# Patient Record
Sex: MALE | Race: WHITE | HISPANIC OR LATINO | Employment: FULL TIME | URBAN - METROPOLITAN AREA
[De-identification: names, ages, dates, MRNs, and addresses within clinical notes are randomized per-mention and may not be internally consistent; named-entity substitution may affect disease eponyms.]

---

## 2024-03-02 NOTE — PROGRESS NOTES
"  Patient ID: [id number]     Chief Complaint: Establish Care        HPI:     Souleymane Reyes is a 43 y.o. male here today   Patient here to establish with PCP/Wellness visit- self referred to clinic.  Patient is interested in obtaining baseline labs.   1) Has had problems with right shoulder pain and right elbow pain for the last 4 years-initially diagnosed with tendinitis-which improved with physical therapy.  Pain restarted to have pain in the last 6 months-increased activity at work-feels that this is most likely related to activity changes.  Was evaluated at an urgent care in the past given meloxicam-did not like the effects of the medication.  Feels that physical therapy would be beneficial    History reviewed. No pertinent past medical history.     Past Surgical History:   Procedure Laterality Date    NO PAST SURGERIES          Social History     Tobacco Use    Smoking status: Never    Smokeless tobacco: Never   Substance Use Topics    Alcohol use: Yes     Alcohol/week: 2.0 standard drinks of alcohol     Types: 2 Cans of beer per week    Drug use: Never        Social History     Socioeconomic History    Marital status:      Spouse name: Lainey    Number of children: 3        No current outpatient medications    Review of patient's allergies indicates:  No Known Allergies     Patient Care Team:  Soledad Rinaldi MD as PCP - General (Family Medicine)     Subjective:     Review of Systems    12 point review of systems conducted, negative except as stated in the history of present illness. See HPI for details.      Objective:     Visit Vitals  /88 (BP Location: Left arm, Patient Position: Sitting)   Pulse (!) 59   Temp 98.7 °F (37.1 °C)   Ht 5' 8" (1.727 m)   Wt 90.3 kg (199 lb)   SpO2 98%   BMI 30.26 kg/m²       Physical Exam    General: Alert and oriented, No acute distress.  Head: Normocephalic, Atraumatic.  Eye: Pupils are equal, round and reactive to light, Extraocular movements are intact, " "Sclera non-icteric.  Ears/Nose/Throat: Normal, Mucosa moist,Clear.  Neck/Thyroid: Supple, Non-tender, No carotid bruit, No palpable thyromegaly or thyroid nodule, No lymphadenopathy, No JVD, Full range of motion.  Respiratory: Clear to auscultation bilaterally; No wheezes, rales or rhonchi,Non-labored respirations, Symmetrical chest wall expansion.  Cardiovascular: Regular rate and rhythm, S1/S2 normal, No murmurs, rubs or gallops.  Gastrointestinal: Soft, Non-tender, Non-distended, Normal bowel sounds, No palpable organomegaly.  Musculoskeletal: Normal range of motion, muscle strength intact, sensation intact area of tenderness below the shoulder-pain also at the elbow level.  Integumentary: Warm, Dry, Intact, No suspicious lesions or rashes.  Extremities: No clubbing, cyanosis or edema  Neurologic: No focal deficits, Cranial Nerves II-XII are grossly intact, Motor strength normal upper and lower extremities, Sensory exam intact.  Psychiatric: Normal interaction, Coherent speech, Euthymic mood, Appropriate affect       Assessment:       ICD-10-CM ICD-9-CM   1. Wellness examination  Z00.00 V70.0   2. Chronic right shoulder pain  M25.511 719.41    G89.29 338.29        Plan:     Testicular Cancer Screening - Reviewed.     Colon Cancer Screening -  Colon cancer screening to start at age 45      Prostate Cancer Screening-  Guidelines discussed-risks versus benefits discussed.     Eye Exam - Recommend annually.     Dental Exam - Recommend biannual exams.     Vaccinations - Immunization guidelines reviewed with the patient.  Encourage patient to prevent disease through immunizations.      There is no immunization history on file for this patient.     1. Wellness examination  Wellness: Five Rules Reviewed: Water/Nutrition-Real Food, Limit Fast Food/ Exercise 20-30 minutes most days of the week/ Mental health- "Is your work a calling or paycheck" Define 'What is your purpose-what matters to you' Stress- Is an Individual " Response- Therefore Can be Controlled by the Individual. Meditation/ Immunizations/Multivitamin use-Vitamin D3/ Screenings   - CBC Auto Differential; Future  - Comprehensive Metabolic Panel; Future  - Lipid Panel; Future  - TSH; Future  - Hemoglobin A1C; Future  - Urinalysis; Future  - Hepatitis C Antibody; Future  - HIV 1/2 Ag/Ab (4th Gen); Future    2. Chronic right shoulder pain  Pathophysiology/Treatment/ Dangers Discussed.  Per patient's request referral to physical therapy.  - Ambulatory referral/consult to Physical/Occupational Therapy; Future      The patient's Health Maintenance was reviewed and the following appears to be due at this time:   Health Maintenance Due   Topic Date Due    Hepatitis C Screening  Never done    Lipid Panel  Never done    COVID-19 Vaccine (1) Never done    HIV Screening  Never done    TETANUS VACCINE  Never done    Hemoglobin A1c (Diabetic Prevention Screening)  Never done    Influenza Vaccine (1) Never done         Follow up in about 2 weeks (around 3/21/2024). In addition to their scheduled follow up, the patient has also been instructed to follow up on as needed basis.     Future Appointments   Date Time Provider Department Center   3/28/2024  9:45 AM Soledad Rinaldi MD [facility] Rinaldi MD

## 2024-03-07 ENCOUNTER — OFFICE VISIT (OUTPATIENT)
Dept: FAMILY MEDICINE | Facility: CLINIC | Age: 44
End: 2024-03-07
Payer: COMMERCIAL

## 2024-03-07 VITALS
HEIGHT: 68 IN | DIASTOLIC BLOOD PRESSURE: 88 MMHG | OXYGEN SATURATION: 98 % | BODY MASS INDEX: 30.16 KG/M2 | SYSTOLIC BLOOD PRESSURE: 130 MMHG | TEMPERATURE: 99 F | WEIGHT: 199 LBS | HEART RATE: 59 BPM

## 2024-03-07 DIAGNOSIS — G89.29 CHRONIC RIGHT SHOULDER PAIN: ICD-10-CM

## 2024-03-07 DIAGNOSIS — M25.511 CHRONIC RIGHT SHOULDER PAIN: ICD-10-CM

## 2024-03-07 DIAGNOSIS — Z00.00 WELLNESS EXAMINATION: Primary | ICD-10-CM

## 2024-03-07 PROCEDURE — 99386 PREV VISIT NEW AGE 40-64: CPT | Mod: ,,, | Performed by: FAMILY MEDICINE

## 2024-03-07 PROCEDURE — 3079F DIAST BP 80-89 MM HG: CPT | Mod: CPTII,,, | Performed by: FAMILY MEDICINE

## 2024-03-07 PROCEDURE — 1160F RVW MEDS BY RX/DR IN RCRD: CPT | Mod: CPTII,,, | Performed by: FAMILY MEDICINE

## 2024-03-07 PROCEDURE — 1159F MED LIST DOCD IN RCRD: CPT | Mod: CPTII,,, | Performed by: FAMILY MEDICINE

## 2024-03-07 PROCEDURE — 3075F SYST BP GE 130 - 139MM HG: CPT | Mod: CPTII,,, | Performed by: FAMILY MEDICINE

## 2024-03-07 PROCEDURE — 3008F BODY MASS INDEX DOCD: CPT | Mod: CPTII,,, | Performed by: FAMILY MEDICINE

## 2024-03-11 ENCOUNTER — LAB VISIT (OUTPATIENT)
Dept: LAB | Facility: HOSPITAL | Age: 44
End: 2024-03-11
Attending: FAMILY MEDICINE
Payer: COMMERCIAL

## 2024-03-11 DIAGNOSIS — Z00.00 WELLNESS EXAMINATION: ICD-10-CM

## 2024-03-11 LAB
ALBUMIN SERPL-MCNC: 4.2 G/DL (ref 3.5–5)
ALBUMIN/GLOB SERPL: 1.4 RATIO (ref 1.1–2)
ALP SERPL-CCNC: 90 UNIT/L (ref 40–150)
ALT SERPL-CCNC: 21 UNIT/L (ref 0–55)
APPEARANCE UR: CLEAR
AST SERPL-CCNC: 17 UNIT/L (ref 5–34)
BACTERIA #/AREA URNS AUTO: ABNORMAL /HPF
BASOPHILS # BLD AUTO: 0.03 X10(3)/MCL
BASOPHILS NFR BLD AUTO: 0.4 %
BILIRUB SERPL-MCNC: 0.6 MG/DL
BILIRUB UR QL STRIP.AUTO: NEGATIVE
BUN SERPL-MCNC: 15.9 MG/DL (ref 8.9–20.6)
CALCIUM SERPL-MCNC: 8.9 MG/DL (ref 8.4–10.2)
CHLORIDE SERPL-SCNC: 107 MMOL/L (ref 98–107)
CHOLEST SERPL-MCNC: 222 MG/DL
CHOLEST/HDLC SERPL: 4 {RATIO} (ref 0–5)
CO2 SERPL-SCNC: 28 MMOL/L (ref 22–29)
COLOR UR AUTO: COLORLESS
CREAT SERPL-MCNC: 0.78 MG/DL (ref 0.73–1.18)
EOSINOPHIL # BLD AUTO: 0.15 X10(3)/MCL (ref 0–0.9)
EOSINOPHIL NFR BLD AUTO: 2.1 %
ERYTHROCYTE [DISTWIDTH] IN BLOOD BY AUTOMATED COUNT: 12.9 % (ref 11.5–17)
EST. AVERAGE GLUCOSE BLD GHB EST-MCNC: 111.2 MG/DL
GFR SERPLBLD CREATININE-BSD FMLA CKD-EPI: >60 MLS/MIN/1.73/M2
GLOBULIN SER-MCNC: 2.9 GM/DL (ref 2.4–3.5)
GLUCOSE SERPL-MCNC: 96 MG/DL (ref 74–100)
GLUCOSE UR QL STRIP.AUTO: NORMAL
HBA1C MFR BLD: 5.5 %
HCT VFR BLD AUTO: 46.2 % (ref 42–52)
HDLC SERPL-MCNC: 54 MG/DL (ref 35–60)
HGB BLD-MCNC: 15.3 G/DL (ref 14–18)
IMM GRANULOCYTES # BLD AUTO: 0.01 X10(3)/MCL (ref 0–0.04)
IMM GRANULOCYTES NFR BLD AUTO: 0.1 %
KETONES UR QL STRIP.AUTO: NEGATIVE
LDLC SERPL CALC-MCNC: 136 MG/DL (ref 50–140)
LEUKOCYTE ESTERASE UR QL STRIP.AUTO: NEGATIVE
LYMPHOCYTES # BLD AUTO: 3.03 X10(3)/MCL (ref 0.6–4.6)
LYMPHOCYTES NFR BLD AUTO: 42.5 %
MCH RBC QN AUTO: 30.4 PG (ref 27–31)
MCHC RBC AUTO-ENTMCNC: 33.1 G/DL (ref 33–36)
MCV RBC AUTO: 91.8 FL (ref 80–94)
MONOCYTES # BLD AUTO: 0.53 X10(3)/MCL (ref 0.1–1.3)
MONOCYTES NFR BLD AUTO: 7.4 %
NEUTROPHILS # BLD AUTO: 3.38 X10(3)/MCL (ref 2.1–9.2)
NEUTROPHILS NFR BLD AUTO: 47.5 %
NITRITE UR QL STRIP.AUTO: NEGATIVE
NRBC BLD AUTO-RTO: 0 %
PH UR STRIP.AUTO: 6 [PH]
PLATELET # BLD AUTO: 153 X10(3)/MCL (ref 130–400)
PMV BLD AUTO: 10.9 FL (ref 7.4–10.4)
POTASSIUM SERPL-SCNC: 3.9 MMOL/L (ref 3.5–5.1)
PROT SERPL-MCNC: 7.1 GM/DL (ref 6.4–8.3)
PROT UR QL STRIP.AUTO: NEGATIVE
RBC # BLD AUTO: 5.03 X10(6)/MCL (ref 4.7–6.1)
RBC #/AREA URNS AUTO: ABNORMAL /HPF
RBC UR QL AUTO: NEGATIVE
SODIUM SERPL-SCNC: 141 MMOL/L (ref 136–145)
SP GR UR STRIP.AUTO: 1 (ref 1–1.03)
SQUAMOUS #/AREA URNS LPF: ABNORMAL /HPF
TRIGL SERPL-MCNC: 158 MG/DL (ref 34–140)
TSH SERPL-ACNC: 3.41 UIU/ML (ref 0.35–4.94)
UROBILINOGEN UR STRIP-ACNC: NORMAL
VLDLC SERPL CALC-MCNC: 32 MG/DL
WBC # SPEC AUTO: 7.13 X10(3)/MCL (ref 4.5–11.5)
WBC #/AREA URNS AUTO: ABNORMAL /HPF

## 2024-03-11 PROCEDURE — 80053 COMPREHEN METABOLIC PANEL: CPT

## 2024-03-11 PROCEDURE — 84443 ASSAY THYROID STIM HORMONE: CPT

## 2024-03-11 PROCEDURE — 83036 HEMOGLOBIN GLYCOSYLATED A1C: CPT

## 2024-03-11 PROCEDURE — 87389 HIV-1 AG W/HIV-1&-2 AB AG IA: CPT

## 2024-03-11 PROCEDURE — 81001 URINALYSIS AUTO W/SCOPE: CPT

## 2024-03-11 PROCEDURE — 80061 LIPID PANEL: CPT

## 2024-03-11 PROCEDURE — 85025 COMPLETE CBC W/AUTO DIFF WBC: CPT

## 2024-03-11 PROCEDURE — 86803 HEPATITIS C AB TEST: CPT

## 2024-03-11 PROCEDURE — 36415 COLL VENOUS BLD VENIPUNCTURE: CPT

## 2024-03-12 LAB
HCV AB SERPL QL IA: NONREACTIVE
HIV 1+2 AB+HIV1 P24 AG SERPL QL IA: NONREACTIVE

## 2024-03-24 NOTE — PROGRESS NOTES
"   Patient ID: [id number]     Chief Complaint: Discuss Lab Results      HPI:     Souleymane Reyes is a 43 y.o. male here today for follow up- to discuss test results.  Patient has been doing well since last visit.      Past Medical History:   Diagnosis Date    Mixed hyperlipidemia 03/29/2024        Past Surgical History:   Procedure Laterality Date    NO PAST SURGERIES          Social History     Tobacco Use    Smoking status: Never    Smokeless tobacco: Never   Substance Use Topics    Alcohol use: Yes     Alcohol/week: 2.0 standard drinks of alcohol     Types: 2 Cans of beer per week    Drug use: Never        Social History     Socioeconomic History    Marital status:      Spouse name: Lainey    Number of children: 3        No current outpatient medications    Review of patient's allergies indicates:  No Known Allergies     Patient Care Team:  Soledad Rinaldi MD as PCP - General (Family Medicine)       Subjective:     Review of Systems    12 point review of systems conducted, negative except as stated in the history of present illness. See HPI for details.      Objective:     Visit Vitals  /86   Pulse 76   Temp 98.6 °F (37 °C)   Ht 5' 8" (1.727 m)   Wt 89.4 kg (197 lb)   SpO2 98%   BMI 29.95 kg/m²       Physical Exam    General: Alert and oriented, No acute distress.  Head: Normocephalic, Atraumatic.  Eye: Pupils are equal, round and reactive to light, Extraocular movements are intact, Sclera non-icteric.  Ears/Nose/Throat: Normal, Mucosa moist,Clear.  Neck/Thyroid: Supple, Non-tender  Respiratory: Clear to auscultation bilaterally  Cardiovascular: Regular rate and rhythm, S1/S2 normal  Gastrointestinal: Soft, Non-tender, Non-distended, Normal bowel sounds, No palpable organomegaly.  Musculoskeletal: Normal range of motion.  Integumentary: Warm, Dry  Extremities: No clubbing, cyanosis or edema  Neurologic: No focal deficits, Cranial Nerves II-XII are grossly intact  Psychiatric: Normal " interaction, Coherent speech       Assessment:       ICD-10-CM ICD-9-CM   1. Encounter to discuss test results  Z71.2 V65.49   2. Mixed hyperlipidemia  E78.2 272.2   3. Wellness examination  Z00.00 V70.0        Plan:     1. Encounter to discuss test results  Abnormal Lab Results: CMP/CBC/HGBA1C/TSH/FLP/UA/ Hepatitis-C/HIV. Discussed.    2. Mixed hyperlipidemia  Lab Results   Component Value Date    CHOL 222 (H) 03/11/2024    .00 03/11/2024    TRIG 158 (H) 03/11/2024    HDL 54 03/11/2024     Pathophysiology/treatment/dangers discussed. Patient to continue diet modification.   Total cholesterol 222 ( Goal less than 200) HDL 54 ( Goal high as possible)  ( Goal less than 100) Triglycerides 158 ( Goal less than 150)    The 10-year ASCVD risk score (Roberta GUDINO, et al., 2019) is: 1.9%    Values used to calculate the score:      Age: 43 years      Sex: Male      Is Non- : No      Diabetic: No      Tobacco smoker: No      Systolic Blood Pressure: 126 mmHg      Is BP treated: No      HDL Cholesterol: 54 mg/dL      Total Cholesterol: 222 mg/dL    3. Wellness examination  Patient given lab orders to be completed before wellness visit.   - CBC Auto Differential; Future  - Comprehensive Metabolic Panel; Future  - Lipid Panel; Future  - TSH; Future  - Hemoglobin A1C; Future  - Urinalysis; Future  - Ambulatory referral/consult to Gastroenterology; Future    Follow up in about 1 year (around 3/28/2025) for Annual Wellness. In addition to their scheduled follow up, the patient has also been instructed to follow up on as needed basis.     Future Appointments   Date Time Provider Department Center   4/11/2025  9:00 AM Soledad Rinaldi MD [facility] [place] Indira Gautam, MD

## 2024-03-28 ENCOUNTER — OFFICE VISIT (OUTPATIENT)
Dept: FAMILY MEDICINE | Facility: CLINIC | Age: 44
End: 2024-03-28
Payer: COMMERCIAL

## 2024-03-28 VITALS
HEART RATE: 76 BPM | SYSTOLIC BLOOD PRESSURE: 126 MMHG | BODY MASS INDEX: 29.86 KG/M2 | TEMPERATURE: 99 F | WEIGHT: 197 LBS | OXYGEN SATURATION: 98 % | HEIGHT: 68 IN | DIASTOLIC BLOOD PRESSURE: 86 MMHG

## 2024-03-28 DIAGNOSIS — Z00.00 WELLNESS EXAMINATION: ICD-10-CM

## 2024-03-28 DIAGNOSIS — Z71.2 ENCOUNTER TO DISCUSS TEST RESULTS: Primary | ICD-10-CM

## 2024-03-28 DIAGNOSIS — E78.2 MIXED HYPERLIPIDEMIA: ICD-10-CM

## 2024-03-28 PROCEDURE — 99213 OFFICE O/P EST LOW 20 MIN: CPT | Mod: ,,, | Performed by: FAMILY MEDICINE

## 2024-03-28 PROCEDURE — 3008F BODY MASS INDEX DOCD: CPT | Mod: CPTII,,, | Performed by: FAMILY MEDICINE

## 2024-03-28 PROCEDURE — 1159F MED LIST DOCD IN RCRD: CPT | Mod: CPTII,,, | Performed by: FAMILY MEDICINE

## 2024-03-28 PROCEDURE — 3079F DIAST BP 80-89 MM HG: CPT | Mod: CPTII,,, | Performed by: FAMILY MEDICINE

## 2024-03-28 PROCEDURE — 1160F RVW MEDS BY RX/DR IN RCRD: CPT | Mod: CPTII,,, | Performed by: FAMILY MEDICINE

## 2024-03-28 PROCEDURE — 3074F SYST BP LT 130 MM HG: CPT | Mod: CPTII,,, | Performed by: FAMILY MEDICINE

## 2024-03-28 PROCEDURE — 3044F HG A1C LEVEL LT 7.0%: CPT | Mod: CPTII,,, | Performed by: FAMILY MEDICINE

## 2024-03-29 PROBLEM — E78.2 MIXED HYPERLIPIDEMIA: Status: ACTIVE | Noted: 2024-03-29

## 2024-05-13 ENCOUNTER — TELEPHONE (OUTPATIENT)
Dept: FAMILY MEDICINE | Facility: CLINIC | Age: 44
End: 2024-05-13
Payer: COMMERCIAL

## 2024-05-13 NOTE — TELEPHONE ENCOUNTER
Spoke with [facility]/ Amaya Lundy, insurance will not pay for colonoscopy screening until pt turns 44 .. She stated to resend referral a day or two after his birthday

## 2025-03-14 ENCOUNTER — OFFICE VISIT (OUTPATIENT)
Dept: URGENT CARE | Facility: CLINIC | Age: 45
End: 2025-03-14
Payer: COMMERCIAL

## 2025-03-14 VITALS
OXYGEN SATURATION: 99 % | TEMPERATURE: 98 F | BODY MASS INDEX: 30.31 KG/M2 | WEIGHT: 200 LBS | DIASTOLIC BLOOD PRESSURE: 101 MMHG | SYSTOLIC BLOOD PRESSURE: 152 MMHG | HEART RATE: 72 BPM | RESPIRATION RATE: 20 BRPM | HEIGHT: 68 IN

## 2025-03-14 DIAGNOSIS — Z71.84 ENCOUNTER FOR HEALTH COUNSELING RELATED TO TRAVEL: Primary | ICD-10-CM

## 2025-03-14 RX ORDER — ATOVAQUONE AND PROGUANIL HYDROCHLORIDE 250; 100 MG/1; MG/1
1 TABLET, FILM COATED ORAL DAILY
Qty: 40 TABLET | Refills: 0 | Status: SHIPPED | OUTPATIENT
Start: 2025-03-14 | End: 2025-04-23

## 2025-03-14 NOTE — PROGRESS NOTES
"Subjective:      Patient ID: Souleymane Reyes is a 44 y.o. male.    Vitals:  height is 5' 8" (1.727 m) and weight is 90.7 kg (200 lb). His tympanic temperature is 98.2 °F (36.8 °C). His blood pressure is 152/101 (abnormal) and his pulse is 72. His respiration is 20 and oxygen saturation is 99%.     Chief Complaint: Medication Refill     Patient is a 44 y.o. male who presents to urgent care with complaints of traveling to the country of [place] and is requesting a prescription of Malarone for malaria prevention.  He states that he will be in country for a maximum of 30 days.  He states that he is up-to-date on vaccinations required/recommended for this country.     ROS   Objective:     Physical Exam   HENT:   Head: Normocephalic and atraumatic.   Nose: Nose normal.   Cardiovascular: Normal rate, regular rhythm and normal heart sounds.   Pulmonary/Chest: Effort normal and breath sounds normal.   Abdominal: Normal appearance.   Neurological: He is alert.   Psychiatric: His behavior is normal. Mood normal.     Per CDC guidelines-  Patient was instructed to start the medication 2 days prior to entry into the country.  He is to continue this medication for 7 days after leaving the country.         Previous History      Review of patient's allergies indicates:  No Known Allergies    Past Medical History:   Diagnosis Date    Mixed hyperlipidemia 03/29/2024     Current Outpatient Medications   Medication Instructions    atovaquone-proguaniL (MALARONE) 250-100 mg Tab 1 tablet, Oral, Daily     Past Surgical History:   Procedure Laterality Date    NO PAST SURGERIES       Family History   Problem Relation Name Age of Onset    No Known Problems Mother          : [place]    Diabetes Father      Hypertension Father         Social History[1]     Physical Exam      Vital Signs Reviewed   BP (!) 152/101   Pulse 72   Temp 98.2 °F (36.8 °C) (Tympanic)   Resp 20   Ht 5' 8" (1.727 m)   Wt 90.7 kg (200 lb)   SpO2 99%   " BMI 30.41 kg/m²        Procedures    Procedures     Labs     Results for orders placed or performed in visit on 03/11/24   Comprehensive Metabolic Panel    Collection Time: 03/11/24  7:18 AM   Result Value Ref Range    Sodium 141 136 - 145 mmol/L    Potassium 3.9 3.5 - 5.1 mmol/L    Chloride 107 98 - 107 mmol/L    CO2 28 22 - 29 mmol/L    Glucose 96 74 - 100 mg/dL    Blood Urea Nitrogen 15.9 8.9 - 20.6 mg/dL    Creatinine 0.78 0.73 - 1.18 mg/dL    Calcium 8.9 8.4 - 10.2 mg/dL    Protein Total 7.1 6.4 - 8.3 gm/dL    Albumin 4.2 3.5 - 5.0 g/dL    Globulin 2.9 2.4 - 3.5 gm/dL    Albumin/Globulin Ratio 1.4 1.1 - 2.0 ratio    Bilirubin Total 0.6 <=1.5 mg/dL    ALP 90 40 - 150 unit/L    ALT 21 0 - 55 unit/L    AST 17 5 - 34 unit/L    eGFR >60 mls/min/1.73/m2   Lipid Panel    Collection Time: 03/11/24  7:18 AM   Result Value Ref Range    Cholesterol Total 222 (H) <=200 mg/dL    HDL Cholesterol 54 35 - 60 mg/dL    Triglyceride 158 (H) 34 - 140 mg/dL    Cholesterol/HDL Ratio 4 0 - 5    Very Low Density Lipoprotein 32     LDL Cholesterol 136.00 50.00 - 140.00 mg/dL   TSH    Collection Time: 03/11/24  7:18 AM   Result Value Ref Range    TSH 3.407 0.350 - 4.940 uIU/mL   Hemoglobin A1C    Collection Time: 03/11/24  7:18 AM   Result Value Ref Range    Hemoglobin A1c 5.5 <=7.0 %    Estimated Average Glucose 111.2 mg/dL   Urinalysis    Collection Time: 03/11/24  7:18 AM   Result Value Ref Range    Color, UA Colorless Yellow, Light-Yellow, Colorless, Straw, Dark-Yellow    Appearance, UA Clear Clear    Specific Gravity, UA 1.002 (L) 1.005 - 1.030    pH, UA 6.0 5.0 - 8.5    Protein, UA Negative Negative    Glucose, UA Normal Negative, Normal    Ketones, UA Negative Negative    Blood, UA Negative Negative    Bilirubin, UA Negative Negative    Urobilinogen, UA Normal 0.2, 1.0, Normal    Nitrites, UA Negative Negative    Leukocyte Esterase, UA Negative Negative    WBC, UA 0-5 None Seen, 0-2, 3-5, 0-5 /HPF    Bacteria, UA None Seen None  Seen, Trace /HPF    Squamous Epithelial Cells, UA None Seen None Seen /HPF    RBC, UA 0-5 None Seen, 0-2, 3-5, 0-5 /HPF   Hepatitis C Antibody    Collection Time: 03/11/24  7:18 AM   Result Value Ref Range    Hep C Ab Interp Nonreactive Nonreactive   HIV 1/2 Ag/Ab (4th Gen)    Collection Time: 03/11/24  7:18 AM   Result Value Ref Range    HIV Nonreactive Nonreactive   CBC with Differential    Collection Time: 03/11/24  7:18 AM   Result Value Ref Range    WBC 7.13 4.50 - 11.50 x10(3)/mcL    RBC 5.03 4.70 - 6.10 x10(6)/mcL    Hgb 15.3 14.0 - 18.0 g/dL    Hct 46.2 42.0 - 52.0 %    MCV 91.8 80.0 - 94.0 fL    MCH 30.4 27.0 - 31.0 pg    MCHC 33.1 33.0 - 36.0 g/dL    RDW 12.9 11.5 - 17.0 %    Platelet 153 130 - 400 x10(3)/mcL    MPV 10.9 (H) 7.4 - 10.4 fL    Neut % 47.5 %    Lymph % 42.5 %    Mono % 7.4 %    Eos % 2.1 %    Basophil % 0.4 %    Lymph # 3.03 0.6 - 4.6 x10(3)/mcL    Neut # 3.38 2.1 - 9.2 x10(3)/mcL    Mono # 0.53 0.1 - 1.3 x10(3)/mcL    Eos # 0.15 0 - 0.9 x10(3)/mcL    Baso # 0.03 <=0.2 x10(3)/mcL    Imm Gran # 0.01 0 - 0.04 x10(3)/mcL    Imm Grans % 0.1 %    NRBC% 0.0 %     Assessment:     1. Encounter for health counseling related to travel        Plan:       Encounter for health counseling related to travel    Other orders  -     atovaquone-proguaniL (MALARONE) 250-100 mg Tab; Take 1 tablet by mouth once daily.  Dispense: 40 tablet; Refill: 0      Follow up as needed.                    [1]   Social History  Tobacco Use    Smoking status: Never    Smokeless tobacco: Never   Substance Use Topics    Alcohol use: Yes     Alcohol/week: 2.0 standard drinks of alcohol     Types: 2 Cans of beer per week    Drug use: Never

## 2025-04-07 ENCOUNTER — TELEPHONE (OUTPATIENT)
Dept: FAMILY MEDICINE | Facility: CLINIC | Age: 45
End: 2025-04-07
Payer: COMMERCIAL

## 2025-04-07 NOTE — TELEPHONE ENCOUNTER
Left vm to confirm appt with fasting labs - orders placed      1. Are there any outstanding tasks in the patient's chart? Yes, fasting labs    2. Is there any documentation in the chart? No    3.Has patient been seen in an ER, Urgent care clinic, or been admitted since last visit?  If yes, When, where, and why    4. Has patient seen any other healthcare providers since last visit?  If yes, when, where, and why    5. Has patient had any bloodwork or XR done since last visit?    6. Is patient signed up for patient portal?

## 2025-05-16 NOTE — PROGRESS NOTES
"  Patient ID: [id number]     Chief Complaint: Annual Exam        HPI:     Souleymane Reyes is a 44 y.o. male here today for an annual wellness. Reviewed and discussed lab results.   Patient had concerns about risk for heart disease-recently evaluated by cardiologist-blood pressure was elevated-patient is started on valsartan- he  has been taking medicine daily. No symptoms associated with increased BP such as headache/ visual changes/ dizziness/ chest pain.     Past Medical History:   Diagnosis Date    Mixed hyperlipidemia 03/29/2024        Past Surgical History:   Procedure Laterality Date    NO PAST SURGERIES          Social History[1]     Social History[2]     Current Outpatient Medications   Medication Instructions    valsartan (DIOVAN) 160 mg, Daily       Review of patient's allergies indicates:  No Known Allergies     Patient Care Team:  Soledad Rinaldi MD as PCP - General (Family Medicine)  Ronnell Gamino MD as Consulting Physician (Cardiology)     Subjective:     Review of Systems    12 point review of systems conducted, negative except as stated in the history of present illness. See HPI for details.      Objective:     Visit Vitals  /88 (BP Location: Left arm, Patient Position: Sitting)   Pulse 78   Temp 98 °F (36.7 °C) (Oral)   Resp 18   Ht 5' 8" (1.727 m)   Wt 89 kg (196 lb 3.2 oz)   SpO2 99%   BMI 29.83 kg/m²       Physical Exam    General: Alert and oriented, No acute distress.  Head: Normocephalic, Atraumatic.  Eye: Pupils are equal, round and reactive to light, Extraocular movements are intact, Sclera non-icteric.  Ears/Nose/Throat: Normal, Mucosa moist,Clear.  Neck/Thyroid: Supple, Non-tender,  No palpable thyromegaly or thyroid nodule, No lymphadenopathy, No JVD, Full range of motion.  Respiratory: Clear to auscultation bilaterally; No wheezes, rales or rhonchi,Non-labored respirations, Symmetrical chest wall expansion.  Cardiovascular: Regular rate and rhythm, S1/S2 normal, No " "murmurs, rubs or gallops.  Gastrointestinal: Soft, Non-tender, Non-distended, Normal bowel sounds, No palpable organomegaly.  Musculoskeletal: Normal range of motion.  Integumentary: Warm, Dry, Intact, No suspicious lesions or rashes.  Extremities: No clubbing, cyanosis or edema  Neurologic: No focal deficits, Cranial Nerves II-XII are grossly intact, Motor strength normal upper and lower extremities, Sensory exam intact.  Psychiatric: Normal interaction, Coherent speech, Euthymic mood, Appropriate affect       Assessment:       ICD-10-CM ICD-9-CM   1. Wellness examination  Z00.00 V70.0   2. Mixed hyperlipidemia  E78.2 272.2   3. Essential hypertension  I10 401.9   4. Abnormal urine  R82.90 791.9        Plan:       Testicular Cancer Screening - Reviewed.     Colon Cancer Screening -  Colon cancer screening referral completed    Prostate Cancer Screening-  Guidelines discussed-risks versus benefits discussed.      Eye Exam - Recommend annually.     Dental Exam - Recommend biannual exams.     Vaccinations -   There is no immunization history on file for this patient. Immunization guidelines reviewed with the patient.  Encourage patient to prevent disease through immunizations.    1. Wellness examination (Primary)  Wellness: Five Rules Reviewed:  Healthy community-Relationships/ Water/Nutrition-Real Food, Limit Fast Food/ Exercise 20-30 minutes most days of the week/ Mental health- "Is your work a calling or paycheck" Define 'What is your purpose-what matters to you' Stress- Is an Individual Response- Therefore Can be Controlled by the Individual. Meditation/ Immunizations/Multivitamin use-Vitamin D3/ Screenings   - Ambulatory referral/consult to Gastroenterology; Future    2. Mixed hyperlipidemia  Lab Results   Component Value Date    CHOL 216 (H) 05/26/2025    .00 05/26/2025    TRIG 145 (H) 05/26/2025    HDL 55 05/26/2025     Pathophysiology/treatment/dangers discussed. Patient to continue diet modification.  " American College of Cardiology guidelines discussed-patient does have appointment for calcium scan this week.  Total cholesterol 216 ( Goal less than 200) HDL 55 ( Goal high as possible)  ( Goal less than 100) Triglycerides 145 ( Goal less than 150)   The 10-year ASCVD risk score (Roberta GUDINO, et al., 2019) is: 2.4%    Values used to calculate the score:      Age: 44 years      Sex: Male      Is Non- : No      Diabetic: No      Tobacco smoker: No      Systolic Blood Pressure: 129 mmHg      Is BP treated: Yes      HDL Cholesterol: 55 mg/dL      Total Cholesterol: 216 mg/dL    3. Essential hypertension  Patient has been taking valsartan. Blood pressure goal of less than 130/80-blood pressure is stable. Continue to encourage diet and activity modifications. Including stress management. Pathophysiology/treatment/dangers discussed.      4. Abnormal urine  Check studies management based upon results.  Pathophysiology/treatment/dangers discussed.    - Urinalysis, Reflex to Urine Culture Urine, Clean Catch; Future        The patient's Health Maintenance was reviewed and the following appears to be due at this time:   Health Maintenance Due   Topic Date Due    TETANUS VACCINE  Never done    Colorectal Cancer Screening  Never done         Follow up in about 1 year (around 5/26/2026) for Annual Wellness. In addition to their scheduled follow up, the patient has also been instructed to follow up on as needed basis.     No future appointments.       Soledad Rinaldi MD           [1]   Social History  Tobacco Use    Smoking status: Never    Smokeless tobacco: Never   Substance Use Topics    Alcohol use: Yes     Alcohol/week: 2.0 standard drinks of alcohol     Types: 2 Cans of beer per week    Drug use: Never   [2]   Social History  Socioeconomic History    Marital status:      Spouse name: Lainey    Number of children: 3

## 2025-05-26 ENCOUNTER — OFFICE VISIT (OUTPATIENT)
Dept: FAMILY MEDICINE | Facility: CLINIC | Age: 45
End: 2025-05-26
Payer: COMMERCIAL

## 2025-05-26 ENCOUNTER — LAB VISIT (OUTPATIENT)
Dept: LAB | Facility: HOSPITAL | Age: 45
End: 2025-05-26
Attending: FAMILY MEDICINE
Payer: COMMERCIAL

## 2025-05-26 VITALS
DIASTOLIC BLOOD PRESSURE: 88 MMHG | WEIGHT: 196.19 LBS | HEIGHT: 68 IN | SYSTOLIC BLOOD PRESSURE: 129 MMHG | TEMPERATURE: 98 F | OXYGEN SATURATION: 99 % | BODY MASS INDEX: 29.73 KG/M2 | RESPIRATION RATE: 18 BRPM | HEART RATE: 78 BPM

## 2025-05-26 DIAGNOSIS — R82.90 ABNORMAL URINE: ICD-10-CM

## 2025-05-26 DIAGNOSIS — Z00.00 WELLNESS EXAMINATION: Primary | ICD-10-CM

## 2025-05-26 DIAGNOSIS — Z00.00 WELLNESS EXAMINATION: ICD-10-CM

## 2025-05-26 DIAGNOSIS — I10 ESSENTIAL HYPERTENSION: ICD-10-CM

## 2025-05-26 DIAGNOSIS — E78.2 MIXED HYPERLIPIDEMIA: ICD-10-CM

## 2025-05-26 LAB
ALBUMIN SERPL-MCNC: 4.1 G/DL (ref 3.5–5)
ALBUMIN/GLOB SERPL: 1.2 RATIO (ref 1.1–2)
ALP SERPL-CCNC: 81 UNIT/L (ref 40–150)
ALT SERPL-CCNC: 16 UNIT/L (ref 0–55)
AMORPH URATE CRY URNS QL MICRO: ABNORMAL /UL
ANION GAP SERPL CALC-SCNC: 6 MEQ/L
AST SERPL-CCNC: 15 UNIT/L (ref 11–45)
BACTERIA #/AREA URNS AUTO: ABNORMAL /HPF
BASOPHILS # BLD AUTO: 0.03 X10(3)/MCL
BASOPHILS NFR BLD AUTO: 0.5 %
BILIRUB SERPL-MCNC: 0.8 MG/DL
BILIRUB UR QL STRIP.AUTO: NEGATIVE
BUN SERPL-MCNC: 17.2 MG/DL (ref 8.9–20.6)
CALCIUM SERPL-MCNC: 9 MG/DL (ref 8.4–10.2)
CHLORIDE SERPL-SCNC: 108 MMOL/L (ref 98–107)
CHOLEST SERPL-MCNC: 216 MG/DL
CHOLEST/HDLC SERPL: 4 {RATIO} (ref 0–5)
CLARITY UR: ABNORMAL
CO2 SERPL-SCNC: 26 MMOL/L (ref 22–29)
COLOR UR AUTO: ABNORMAL
CREAT SERPL-MCNC: 0.84 MG/DL (ref 0.72–1.25)
CREAT/UREA NIT SERPL: 20
EOSINOPHIL # BLD AUTO: 0.12 X10(3)/MCL (ref 0–0.9)
EOSINOPHIL NFR BLD AUTO: 2 %
ERYTHROCYTE [DISTWIDTH] IN BLOOD BY AUTOMATED COUNT: 13.1 % (ref 11.5–17)
EST. AVERAGE GLUCOSE BLD GHB EST-MCNC: 111.2 MG/DL
GFR SERPLBLD CREATININE-BSD FMLA CKD-EPI: >60 ML/MIN/1.73/M2
GLOBULIN SER-MCNC: 3.3 GM/DL (ref 2.4–3.5)
GLUCOSE SERPL-MCNC: 97 MG/DL (ref 74–100)
GLUCOSE UR QL STRIP: NORMAL
HBA1C MFR BLD: 5.5 %
HCT VFR BLD AUTO: 45.3 % (ref 42–52)
HDLC SERPL-MCNC: 55 MG/DL (ref 35–60)
HGB BLD-MCNC: 14.9 G/DL (ref 14–18)
HGB UR QL STRIP: ABNORMAL
IMM GRANULOCYTES # BLD AUTO: 0.02 X10(3)/MCL (ref 0–0.04)
IMM GRANULOCYTES NFR BLD AUTO: 0.3 %
KETONES UR QL STRIP: NEGATIVE
LDLC SERPL CALC-MCNC: 132 MG/DL (ref 50–140)
LEUKOCYTE ESTERASE UR QL STRIP: NEGATIVE
LYMPHOCYTES # BLD AUTO: 2.47 X10(3)/MCL (ref 0.6–4.6)
LYMPHOCYTES NFR BLD AUTO: 40.3 %
MCH RBC QN AUTO: 30.5 PG (ref 27–31)
MCHC RBC AUTO-ENTMCNC: 32.9 G/DL (ref 33–36)
MCV RBC AUTO: 92.8 FL (ref 80–94)
MONOCYTES # BLD AUTO: 0.42 X10(3)/MCL (ref 0.1–1.3)
MONOCYTES NFR BLD AUTO: 6.9 %
MUCOUS THREADS URNS QL MICRO: ABNORMAL /LPF
NEUTROPHILS # BLD AUTO: 3.07 X10(3)/MCL (ref 2.1–9.2)
NEUTROPHILS NFR BLD AUTO: 50 %
NITRITE UR QL STRIP: NEGATIVE
NRBC BLD AUTO-RTO: 0 %
PH UR STRIP: 5.5 [PH]
PLATELET # BLD AUTO: 160 X10(3)/MCL (ref 130–400)
PMV BLD AUTO: 11.5 FL (ref 7.4–10.4)
POTASSIUM SERPL-SCNC: 4.2 MMOL/L (ref 3.5–5.1)
PROT SERPL-MCNC: 7.4 GM/DL (ref 6.4–8.3)
PROT UR QL STRIP: NEGATIVE
RBC # BLD AUTO: 4.88 X10(6)/MCL (ref 4.7–6.1)
RBC #/AREA URNS AUTO: ABNORMAL /HPF
SODIUM SERPL-SCNC: 140 MMOL/L (ref 136–145)
SP GR UR STRIP.AUTO: 1.02 (ref 1–1.03)
SQUAMOUS #/AREA URNS LPF: ABNORMAL /HPF
TRIGL SERPL-MCNC: 145 MG/DL (ref 34–140)
TSH SERPL-ACNC: 0.84 UIU/ML (ref 0.35–4.94)
UROBILINOGEN UR STRIP-ACNC: NORMAL
VLDLC SERPL CALC-MCNC: 29 MG/DL
WBC # BLD AUTO: 6.13 X10(3)/MCL (ref 4.5–11.5)
WBC #/AREA URNS AUTO: ABNORMAL /HPF

## 2025-05-26 PROCEDURE — 4010F ACE/ARB THERAPY RXD/TAKEN: CPT | Mod: CPTII,,, | Performed by: FAMILY MEDICINE

## 2025-05-26 PROCEDURE — 1159F MED LIST DOCD IN RCRD: CPT | Mod: CPTII,,, | Performed by: FAMILY MEDICINE

## 2025-05-26 PROCEDURE — 84443 ASSAY THYROID STIM HORMONE: CPT

## 2025-05-26 PROCEDURE — 80061 LIPID PANEL: CPT

## 2025-05-26 PROCEDURE — 1160F RVW MEDS BY RX/DR IN RCRD: CPT | Mod: CPTII,,, | Performed by: FAMILY MEDICINE

## 2025-05-26 PROCEDURE — 3074F SYST BP LT 130 MM HG: CPT | Mod: CPTII,,, | Performed by: FAMILY MEDICINE

## 2025-05-26 PROCEDURE — 83036 HEMOGLOBIN GLYCOSYLATED A1C: CPT

## 2025-05-26 PROCEDURE — 80053 COMPREHEN METABOLIC PANEL: CPT

## 2025-05-26 PROCEDURE — 99396 PREV VISIT EST AGE 40-64: CPT | Mod: ,,, | Performed by: FAMILY MEDICINE

## 2025-05-26 PROCEDURE — 3044F HG A1C LEVEL LT 7.0%: CPT | Mod: CPTII,,, | Performed by: FAMILY MEDICINE

## 2025-05-26 PROCEDURE — 81001 URINALYSIS AUTO W/SCOPE: CPT

## 2025-05-26 PROCEDURE — 85025 COMPLETE CBC W/AUTO DIFF WBC: CPT

## 2025-05-26 PROCEDURE — 36415 COLL VENOUS BLD VENIPUNCTURE: CPT

## 2025-05-26 PROCEDURE — 3079F DIAST BP 80-89 MM HG: CPT | Mod: CPTII,,, | Performed by: FAMILY MEDICINE

## 2025-05-26 PROCEDURE — 3008F BODY MASS INDEX DOCD: CPT | Mod: CPTII,,, | Performed by: FAMILY MEDICINE

## 2025-05-26 RX ORDER — VALSARTAN 160 MG/1
160 TABLET ORAL DAILY
COMMUNITY
Start: 2025-05-13

## 2025-05-27 ENCOUNTER — TELEPHONE (OUTPATIENT)
Dept: FAMILY MEDICINE | Facility: CLINIC | Age: 45
End: 2025-05-27
Payer: COMMERCIAL

## 2025-05-27 ENCOUNTER — LAB VISIT (OUTPATIENT)
Dept: LAB | Facility: HOSPITAL | Age: 45
End: 2025-05-27
Attending: FAMILY MEDICINE
Payer: COMMERCIAL

## 2025-05-27 ENCOUNTER — RESULTS FOLLOW-UP (OUTPATIENT)
Dept: FAMILY MEDICINE | Facility: CLINIC | Age: 45
End: 2025-05-27

## 2025-05-27 DIAGNOSIS — R82.90 ABNORMAL URINE: ICD-10-CM

## 2025-05-27 LAB
BACTERIA #/AREA URNS AUTO: ABNORMAL /HPF
BILIRUB UR QL STRIP.AUTO: NEGATIVE
CLARITY UR: CLEAR
COLOR UR AUTO: ABNORMAL
GLUCOSE UR QL STRIP: NORMAL
HGB UR QL STRIP: NEGATIVE
KETONES UR QL STRIP: NEGATIVE
LEUKOCYTE ESTERASE UR QL STRIP: NEGATIVE
MUCOUS THREADS URNS QL MICRO: ABNORMAL /LPF
NITRITE UR QL STRIP: NEGATIVE
PH UR STRIP: 6 [PH]
PROT UR QL STRIP: NEGATIVE
RBC #/AREA URNS AUTO: ABNORMAL /HPF
SP GR UR STRIP.AUTO: 1.01 (ref 1–1.03)
SQUAMOUS #/AREA URNS LPF: ABNORMAL /HPF
UROBILINOGEN UR STRIP-ACNC: NORMAL
WBC #/AREA URNS AUTO: ABNORMAL /HPF

## 2025-05-27 PROCEDURE — 81015 MICROSCOPIC EXAM OF URINE: CPT
